# Patient Record
Sex: FEMALE | Race: WHITE | NOT HISPANIC OR LATINO | Employment: FULL TIME | URBAN - METROPOLITAN AREA
[De-identification: names, ages, dates, MRNs, and addresses within clinical notes are randomized per-mention and may not be internally consistent; named-entity substitution may affect disease eponyms.]

---

## 2024-08-27 ENCOUNTER — OFFICE VISIT (OUTPATIENT)
Dept: PRIMARY CARE | Facility: CLINIC | Age: 44
End: 2024-08-27
Payer: COMMERCIAL

## 2024-08-27 VITALS
HEART RATE: 67 BPM | DIASTOLIC BLOOD PRESSURE: 68 MMHG | OXYGEN SATURATION: 97 % | WEIGHT: 124 LBS | BODY MASS INDEX: 22.03 KG/M2 | SYSTOLIC BLOOD PRESSURE: 118 MMHG | TEMPERATURE: 98.7 F

## 2024-08-27 DIAGNOSIS — R20.2 PARESTHESIA OF RIGHT UPPER EXTREMITY: Primary | ICD-10-CM

## 2024-08-27 DIAGNOSIS — Z00.00 ROUTINE HEALTH MAINTENANCE: ICD-10-CM

## 2024-08-27 PROBLEM — K21.9 GERD (GASTROESOPHAGEAL REFLUX DISEASE): Status: ACTIVE | Noted: 2024-08-27

## 2024-08-27 PROBLEM — K58.2 MIXED IRRITABLE BOWEL SYNDROME: Status: ACTIVE | Noted: 2024-08-27

## 2024-08-27 PROBLEM — K29.70 GASTRITIS: Status: ACTIVE | Noted: 2024-08-27

## 2024-08-27 PROBLEM — D12.6 COLON ADENOMA: Status: ACTIVE | Noted: 2024-08-27

## 2024-08-27 PROBLEM — I73.00 RAYNAUD'S DISEASE: Status: ACTIVE | Noted: 2024-08-27

## 2024-08-27 PROBLEM — E55.9 VITAMIN D INSUFFICIENCY: Status: ACTIVE | Noted: 2024-08-27

## 2024-08-27 PROBLEM — N39.3 SUI (STRESS URINARY INCONTINENCE, FEMALE): Status: ACTIVE | Noted: 2024-08-27

## 2024-08-27 PROBLEM — K63.5 SESSILE COLONIC POLYP: Status: ACTIVE | Noted: 2024-08-27

## 2024-08-27 PROCEDURE — 99214 OFFICE O/P EST MOD 30 MIN: CPT | Performed by: FAMILY MEDICINE

## 2024-08-27 PROCEDURE — 1036F TOBACCO NON-USER: CPT | Performed by: FAMILY MEDICINE

## 2024-08-27 RX ORDER — ASPIRIN 81 MG/1
81 TABLET ORAL DAILY
COMMUNITY

## 2024-08-27 RX ORDER — LORATADINE 10 MG/1
CAPSULE, LIQUID FILLED ORAL
COMMUNITY

## 2024-08-27 RX ORDER — FLUTICASONE PROPIONATE 50 MCG
SPRAY, SUSPENSION (ML) NASAL
COMMUNITY

## 2024-08-27 RX ORDER — PREDNISONE 20 MG/1
TABLET ORAL
Qty: 18 TABLET | Refills: 0 | Status: SHIPPED | OUTPATIENT
Start: 2024-08-27 | End: 2024-09-04

## 2024-08-27 ASSESSMENT — ENCOUNTER SYMPTOMS
NUMBNESS: 1
COUGH: 0
SHORTNESS OF BREATH: 0
WEAKNESS: 0
FEVER: 0

## 2024-08-27 NOTE — PROGRESS NOTES
Subjective   Patient ID: Kennedi Tan is a 44 y.o. female who presents for Arm Numbness (Right arm numbness/tingling radiating from neck to fingers x few months /).    HPI   She is here today to discuss right arm paresthesias  A few months ago she had developed a dull pain in the upper part of her right arm.  There was no injury prior to onset, however she does do a lot of of lifting (her  is disabled)  Over the past month that she has had a tingling sensation.  This involves her entire right arm from her shoulder to her fingertips.  No weakness.  No numbness.  Nothing aggravates symptoms.  This is always present  She does also have a pain in her right lower neck but she states that this is mild  Taking Advil with some improvement  She is a former smoker.  Quit 15 to 16 years ago.  Prior to this it smoked up to 1 pack/day for approximately 10 years  No left arm symptoms      Review of Systems   Constitutional:  Negative for fever.   Respiratory:  Negative for cough and shortness of breath.    Cardiovascular:  Negative for chest pain.   Neurological:  Positive for numbness. Negative for weakness.       Objective   /68   Pulse 67   Temp 37.1 °C (98.7 °F) (Temporal)   Wt 56.2 kg (124 lb)   SpO2 97%   BMI 22.03 kg/m²     Physical Exam  Vitals reviewed.   Constitutional:       General: She is not in acute distress.     Appearance: Normal appearance. She is well-developed.   HENT:      Head: Normocephalic.   Eyes:      Conjunctiva/sclera: Conjunctivae normal.   Cardiovascular:      Rate and Rhythm: Normal rate and regular rhythm.      Heart sounds: Normal heart sounds.   Pulmonary:      Effort: Pulmonary effort is normal.      Breath sounds: Normal breath sounds.   Musculoskeletal:         General: Tenderness present.      Comments: Mild tenderness involving the right lower cervical paraspinals and superior aspect of the trapezius.  She has full right and left active cervical rotation.  She notes  aggravation of her right hand symptoms with Spurling's test on right side.  Upper extremity strength is plus 5 out of 5 bilaterally.  Upper extremity sensation is intact.  Biceps reflexes are slightly diminished on both the left and right side   Skin:     Findings: No rash.   Neurological:      Mental Status: She is alert.   Psychiatric:         Mood and Affect: Mood normal.         Behavior: Behavior normal.         Assessment/Plan   Assessment & Plan  Paresthesia of right upper extremity    Orders:    CBC; Future    Comprehensive Metabolic Panel; Future    Lipid Panel; Future    TSH with reflex to Free T4 if abnormal; Future    Vitamin B12; Future    predniSONE (Deltasone) 20 mg tablet; Take 3 tablets (60 mg) by mouth once daily for 3 days, THEN 2 tablets (40 mg) once daily for 3 days, THEN 1 tablet (20 mg) once daily for 3 days.    XR cervical spine complete 4-5 views; Future    EMG & nerve conduction; Future    Routine health maintenance    Orders:    CBC; Future    Comprehensive Metabolic Panel; Future    Lipid Panel; Future    TSH with reflex to Free T4 if abnormal; Future    Vitamin B12; Future    predniSONE (Deltasone) 20 mg tablet; Take 3 tablets (60 mg) by mouth once daily for 3 days, THEN 2 tablets (40 mg) once daily for 3 days, THEN 1 tablet (20 mg) once daily for 3 days.    XR cervical spine complete 4-5 views; Future    EMG & nerve conduction; Future    She presents today with a 1 month history of paresthesias involving her entire right upper extremity.  She does have mild neck pain and a positive Spurling's test on the right side, and I suspect that this would most likely be originating in her cervical spine.  We we will obtain a cervical spine x-ray as well as a right upper extremity EMG to further evaluate.  Treat with a 9-day prednisone taper, and we will also check labs including TSH, B12 and glucose to help rule out any metabolic abnormality which may be contributing to this.  Follow-up in 1  month for recheck and physical.  If symptoms have persisted or EMG is abnormal, we will discuss further workup with a possible cervical spine MRI at that time

## 2024-09-10 ENCOUNTER — HOSPITAL ENCOUNTER (OUTPATIENT)
Dept: RADIOLOGY | Facility: HOSPITAL | Age: 44
Discharge: HOME | End: 2024-09-10
Payer: COMMERCIAL

## 2024-09-10 DIAGNOSIS — Z00.00 ROUTINE HEALTH MAINTENANCE: ICD-10-CM

## 2024-09-10 DIAGNOSIS — R20.2 PARESTHESIA OF RIGHT UPPER EXTREMITY: ICD-10-CM

## 2024-09-10 PROCEDURE — 72050 X-RAY EXAM NECK SPINE 4/5VWS: CPT

## 2024-09-10 PROCEDURE — 72050 X-RAY EXAM NECK SPINE 4/5VWS: CPT | Performed by: STUDENT IN AN ORGANIZED HEALTH CARE EDUCATION/TRAINING PROGRAM

## 2024-09-11 ENCOUNTER — TELEPHONE (OUTPATIENT)
Dept: PRIMARY CARE | Facility: CLINIC | Age: 44
End: 2024-09-11
Payer: COMMERCIAL

## 2024-09-11 DIAGNOSIS — R93.89 ABNORMAL X-RAY: Primary | ICD-10-CM

## 2024-09-14 ENCOUNTER — HOSPITAL ENCOUNTER (OUTPATIENT)
Dept: RADIOLOGY | Facility: CLINIC | Age: 44
Discharge: HOME | End: 2024-09-14
Payer: COMMERCIAL

## 2024-09-14 DIAGNOSIS — R93.89 ABNORMAL X-RAY: ICD-10-CM

## 2024-09-14 PROCEDURE — 76536 US EXAM OF HEAD AND NECK: CPT | Performed by: STUDENT IN AN ORGANIZED HEALTH CARE EDUCATION/TRAINING PROGRAM

## 2024-09-14 PROCEDURE — 76536 US EXAM OF HEAD AND NECK: CPT

## 2024-09-18 ENCOUNTER — HOSPITAL ENCOUNTER (OUTPATIENT)
Dept: RADIOLOGY | Facility: HOSPITAL | Age: 44
Discharge: HOME | End: 2024-09-18
Payer: COMMERCIAL

## 2024-09-18 ENCOUNTER — APPOINTMENT (OUTPATIENT)
Dept: PRIMARY CARE | Facility: CLINIC | Age: 44
End: 2024-09-18
Payer: COMMERCIAL

## 2024-09-18 VITALS
TEMPERATURE: 97.7 F | OXYGEN SATURATION: 98 % | BODY MASS INDEX: 22.03 KG/M2 | SYSTOLIC BLOOD PRESSURE: 109 MMHG | DIASTOLIC BLOOD PRESSURE: 75 MMHG | WEIGHT: 124 LBS | HEART RATE: 73 BPM

## 2024-09-18 DIAGNOSIS — Z12.31 ENCOUNTER FOR SCREENING MAMMOGRAM FOR MALIGNANT NEOPLASM OF BREAST: ICD-10-CM

## 2024-09-18 DIAGNOSIS — G89.29 CHRONIC NECK PAIN: Primary | ICD-10-CM

## 2024-09-18 DIAGNOSIS — M54.2 CHRONIC NECK PAIN: Primary | ICD-10-CM

## 2024-09-18 DIAGNOSIS — R20.0 RIGHT ARM NUMBNESS: ICD-10-CM

## 2024-09-18 PROCEDURE — 77063 BREAST TOMOSYNTHESIS BI: CPT | Performed by: RADIOLOGY

## 2024-09-18 PROCEDURE — 1036F TOBACCO NON-USER: CPT | Performed by: FAMILY MEDICINE

## 2024-09-18 PROCEDURE — 77067 SCR MAMMO BI INCL CAD: CPT

## 2024-09-18 PROCEDURE — 99214 OFFICE O/P EST MOD 30 MIN: CPT | Performed by: FAMILY MEDICINE

## 2024-09-18 PROCEDURE — 77067 SCR MAMMO BI INCL CAD: CPT | Performed by: RADIOLOGY

## 2024-09-18 RX ORDER — MELOXICAM 15 MG/1
15 TABLET ORAL DAILY PRN
Qty: 30 TABLET | Refills: 1 | Status: SHIPPED | OUTPATIENT
Start: 2024-09-18

## 2024-09-18 RX ORDER — GABAPENTIN 100 MG/1
100 CAPSULE ORAL DAILY
Qty: 30 CAPSULE | Refills: 2 | Status: SHIPPED | OUTPATIENT
Start: 2024-09-18

## 2024-09-18 ASSESSMENT — ENCOUNTER SYMPTOMS
NUMBNESS: 1
WEAKNESS: 1
NECK PAIN: 1
HEADACHES: 0
COUGH: 0
FEVER: 0

## 2024-09-18 NOTE — PROGRESS NOTES
Subjective   Patient ID: Kennedi Tan is a 44 y.o. female who presents for No chief complaint on file..    HPI     Review of Systems    Objective   /75   Pulse 73   Temp 36.5 °C (97.7 °F) (Temporal)   Wt 56.2 kg (124 lb)   SpO2 98%   BMI 22.03 kg/m²     Physical Exam    Assessment/Plan   Assessment & Plan  Neck pain    Orders:    MR cervical spine wo IV contrast; Future    meloxicam (Mobic) 15 mg tablet; Take 1 tablet (15 mg) by mouth once daily as needed (pain).    gabapentin (Neurontin) 100 mg capsule; Take 1 capsule (100 mg) by mouth once daily.    Right arm numbness    Orders:    MR cervical spine wo IV contrast; Future    meloxicam (Mobic) 15 mg tablet; Take 1 tablet (15 mg) by mouth once daily as needed (pain).    gabapentin (Neurontin) 100 mg capsule; Take 1 capsule (100 mg) by mouth once daily.

## 2024-09-18 NOTE — PROGRESS NOTES
Subjective   Patient ID: Kennedi Tan is a 44 y.o. female who presents for No chief complaint on file..    HPI       She is here today for follow-up on neck pain and right arm symptoms  She was most recently seen on 8/27.    A few months prior to this appointment she had developed a dull pain involving the upper part of her right arm.  1 month prior to the last appointment, she had developed a tingling sensation which would involve her entire right arm from her shoulder to her fingertips    At that time we ordered labs, cervical spine x-ray, and right upper extremity EMG to further evaluate.  She was treated with a 9-day tapering course of prednisone    Cervical spine x-ray done 9/10/2024 showed straightening of the cervical spine either positional or related to muscle spasm.  No acute displaced fracture or major malalignment.  There was also a finding of nonspecific bilateral asymmetric soft tissue opacity surrounding the upper cervical region could be overlapping shadowing/artifact or prominent thyroid tissue  She had a neck ultrasound done 9/14/2024 to follow-up on the above findings.  Neck ultrasound was unremarkable and did not show any suspicious lymphadenopathy or thyroid nodules      She has continued to have symptoms.  Since taking the steroid she has noticed that the pain seems to be more localized to her upper shoulder area.  She has had pain in her neck which has been present for approximately 3 months.  This is mild.  Most of her pain is located in her right superior shoulder area.  This is a burning pain which can be up to a 5 out of 10 intensity  She has been getting intermittent paresthesias and now is getting intermittent numbness in her right arm  Over the past few weeks, her entire right arm will go numb.  This occurs several times per day and can occur when she is using her arm.  She will also get tingling involving her entire right arm which can be triggered by fully extending her neck  Her  right arm has felt heavy and she will get some weakness    She has been doing home stretches and taking analgesics for at least the past 6 weeks without improvement  No cough or wheezing  No headache  No numbness or weakness involving left arm or either leg  She is scheduled to have an EMG but this is not scheduled until next month    Review of Systems   Constitutional:  Negative for fever.   Respiratory:  Negative for cough.    Cardiovascular:  Negative for chest pain.   Musculoskeletal:  Positive for neck pain.   Neurological:  Positive for weakness and numbness. Negative for headaches.       Objective   /75   Pulse 73   Temp 36.5 °C (97.7 °F) (Temporal)   Wt 56.2 kg (124 lb)   SpO2 98%   BMI 22.03 kg/m²     Physical Exam  Vitals reviewed.   Constitutional:       General: She is not in acute distress.     Appearance: Normal appearance. She is well-developed.   HENT:      Head: Normocephalic.   Eyes:      Conjunctiva/sclera: Conjunctivae normal.   Cardiovascular:      Rate and Rhythm: Normal rate and regular rhythm.      Heart sounds: Normal heart sounds.   Pulmonary:      Effort: Pulmonary effort is normal.      Breath sounds: Normal breath sounds.   Musculoskeletal:         General: Tenderness present.      Comments: Tenderness involving the right C5-C7 cervical paraspinals and also superior aspect of right shoulder  She has a full left and right cervical rotation.  Active extension of her neck reproduces numbness in her right arm.  She has a positive Spurling's test on the right  She has slightly decreased sensation involving the first through the fifth fingers of her right hand (greatest 1st through 3rd)  Biceps reflexes are decreased at plus 1 out of 4 bilaterally  Normal  strength.  She has slight weakness with right shoulder abduction against resistance   Skin:     Findings: No rash.   Neurological:      Mental Status: She is alert.      Sensory: Sensory deficit present.      Motor: Weakness  present.      Deep Tendon Reflexes: Reflexes abnormal.   Psychiatric:         Mood and Affect: Mood normal.         Behavior: Behavior normal.         Assessment/Plan   Assessment & Plan  Chronic neck pain    Orders:    MR cervical spine wo IV contrast; Future    meloxicam (Mobic) 15 mg tablet; Take 1 tablet (15 mg) by mouth once daily as needed (pain).    gabapentin (Neurontin) 100 mg capsule; Take 1 capsule (100 mg) by mouth once daily.    Right arm numbness    Orders:    MR cervical spine wo IV contrast; Future    meloxicam (Mobic) 15 mg tablet; Take 1 tablet (15 mg) by mouth once daily as needed (pain).    gabapentin (Neurontin) 100 mg capsule; Take 1 capsule (100 mg) by mouth once daily.    She presents today with a 3-month history of neck pain, with accompanying paresthesias, numbness, and weakness involving her right arm  She has done at least 6 weeks of conservative therapy including home stretches and analgesics without improvement  We reviewed her cervical spine x-ray done 9/10/2024 which did not show any acute findings  On exam today she does have a positive Spurling's test on the right side, decreased sensation involving the 1st through 5th fingers of her right hand, abnormal biceps reflexes and slight weakness with shoulder abduction against resistance.  I would like to proceed with a cervical spine MRI to further evaluate.  We will order this today.  Start meloxicam 15 mg daily as needed and also gabapentin 100 mg daily as needed.  Adverse effects of gabapentin have been discussed and O[facility] reviewed.  Plan on following up by phone once I have MRI results to discuss further management

## 2024-09-27 ENCOUNTER — APPOINTMENT (OUTPATIENT)
Dept: PRIMARY CARE | Facility: CLINIC | Age: 44
End: 2024-09-27
Payer: COMMERCIAL

## 2024-10-03 ENCOUNTER — HOSPITAL ENCOUNTER (OUTPATIENT)
Dept: RADIOLOGY | Facility: CLINIC | Age: 44
Discharge: HOME | End: 2024-10-03
Payer: COMMERCIAL

## 2024-10-03 DIAGNOSIS — R20.0 RIGHT ARM NUMBNESS: ICD-10-CM

## 2024-10-03 DIAGNOSIS — G89.29 CHRONIC NECK PAIN: ICD-10-CM

## 2024-10-03 DIAGNOSIS — M54.2 CHRONIC NECK PAIN: ICD-10-CM

## 2024-10-03 PROCEDURE — 72141 MRI NECK SPINE W/O DYE: CPT

## 2024-10-07 ENCOUNTER — TELEPHONE (OUTPATIENT)
Dept: PRIMARY CARE | Facility: CLINIC | Age: 44
End: 2024-10-07
Payer: COMMERCIAL

## 2024-10-07 DIAGNOSIS — M47.22 OSTEOARTHRITIS OF SPINE WITH RADICULOPATHY, CERVICAL REGION: Primary | ICD-10-CM

## 2024-10-07 NOTE — TELEPHONE ENCOUNTER
I reviewed her MRI results.    This test was ordered due to a several month history of pain involving her right lower neck and right superior shoulder area, as well as paresthesias and intermittent numbness involving her entire right arm.  Symptoms are triggered with neck extension    Her cervical spine MRI shows mild degenerative changes on the right at C5-6 with no significant central canal stenosis.  I discussed results with her over the phone.  She is still having symptoms.  She is scheduled to have a right upper extremity EMG done on 10/22  Given that she is still having symptoms, I would like to refer her to orthopedics for further evaluation and to discuss whether or not they feel that these findings are the cause of her right arm symptoms  We will plan on following up by phone once I have her EMG results.  Recommended that she contact me if any new or worsening symptoms

## 2024-10-21 ENCOUNTER — OFFICE VISIT (OUTPATIENT)
Dept: ORTHOPEDIC SURGERY | Facility: CLINIC | Age: 44
End: 2024-10-21
Payer: COMMERCIAL

## 2024-10-21 DIAGNOSIS — M54.12 CERVICAL RADICULOPATHY: Primary | ICD-10-CM

## 2024-10-21 DIAGNOSIS — M47.22 OSTEOARTHRITIS OF SPINE WITH RADICULOPATHY, CERVICAL REGION: ICD-10-CM

## 2024-10-21 PROCEDURE — 99204 OFFICE O/P NEW MOD 45 MIN: CPT | Performed by: PHYSICIAN ASSISTANT

## 2024-10-21 PROCEDURE — 99214 OFFICE O/P EST MOD 30 MIN: CPT | Performed by: PHYSICIAN ASSISTANT

## 2024-10-21 NOTE — PROGRESS NOTES
Kennedi Tan is a 44 y.o. female who presents for Pain and New Patient Visit of the Neck (Pain in the neck, tingling/numbness on the right side radiates down the arm/Ongoing for a couple months/Denies injury/fall/Xray @  [facility] 9/10.24/MRI @  [facility] 10/3/24).    HPI:  44-year-old female here for neck pain and right arm radicular symptoms.  She denies any fever chills nausea vomiting night sweats.  She has no bowel or bladder complaints.    Physical exam:  Well-nourished, well kept.  No lymphangitis or lymphadenopathy in the examined extremities. Affect normal.  Alert and oriented X 3.  Coordination normal.  Patient can rise from a seated position, can sit from a standing position. Can stand on heels and toes.  Patient is tender in the paraspinal musculature of the cervical spine. range of motion is mildly decreased secondary to some pain and stiffness no weakness no instability to muscle strength. examination of the upper extremities reveals no point tenderness, swelling, or deformity.  Range of motion of the shoulders, elbows, wrists, and fingers are full without crepitance, instability, or exacerbation of pain. Strength is 5/5 throughout. no redness, abrasions, or lesions on the upper extremities bilaterally.  Gross sensation intact to the extremities.  Deep tendon reflexes 2+ and symmetric bilaterally.  Caraballo negative.     Imaging studies:  An MRI of the cervical spine from October 3, 2024 was reviewed.  X-rays of the cervical spine from September 10, 2024 were reviewed.    Assessment:  44-year-old female here for evaluation of mostly right arm radicular symptoms and a little bit of right sided neck stiffness.  This has been going on for about 2 months.  She does do a lot of heavy lifting, she carries heavy water bottles up on her right shoulder.  She saw her primary care doctor, Dr. Stevens who sent her over to us.  She was treated initially with steroids, and now gabapentin.  She has noticed that most of the  pain in the right arm is gone and all she has is some numbness and tingling down the arm into the hand into the thumb and index finger mostly.  Thing on the left side.  No significant neck pain.  She has not done anything conservatively for this and does not have chronic neck problems.  Her x-rays show some mild degenerative changes, on the MRI she does have a herniated disc at C5-6 on the right causing some mild to moderate foraminal stenosis at that level.  This is most likely her symptom generator.  We did discuss options today, she does not want to think about surgery at this point and would like to try more conservative methods.    Plan:  I would like to get her into some physical therapy, something with a manual component and modalities.  I will see her back in 6 weeks.  If we ever end up doing an operation on her it would be a C5-6 total disc arthroplasty versus ACDF.    I have reviewed tests, x-rays, MRI, ultrasound of the neck.  She is here with someone today acting as a helpful and necessary historian.  This is an undiagnosed new problem with uncertain prognosis that has the potential to affect her bodily function.    Matheus Leon PA-C

## 2024-10-22 ENCOUNTER — TELEPHONE (OUTPATIENT)
Dept: PRIMARY CARE | Facility: CLINIC | Age: 44
End: 2024-10-22

## 2024-10-22 ENCOUNTER — HOSPITAL ENCOUNTER (OUTPATIENT)
Dept: NEUROLOGY | Facility: HOSPITAL | Age: 44
Discharge: HOME | End: 2024-10-22
Payer: COMMERCIAL

## 2024-10-22 DIAGNOSIS — Z00.00 ROUTINE HEALTH MAINTENANCE: ICD-10-CM

## 2024-10-22 DIAGNOSIS — R20.2 PARESTHESIA OF RIGHT UPPER EXTREMITY: ICD-10-CM

## 2024-10-22 PROCEDURE — 95886 MUSC TEST DONE W/N TEST COMP: CPT | Performed by: PSYCHIATRY & NEUROLOGY

## 2024-10-22 PROCEDURE — 95909 NRV CNDJ TST 5-6 STUDIES: CPT | Performed by: PSYCHIATRY & NEUROLOGY

## 2024-10-22 NOTE — TELEPHONE ENCOUNTER
Her EMG was unremarkable.  She did see orthopedics and they feel that her right arm symptoms are related to a herniated disc at C5-6 on the right causing mild to moderate foraminal stenosis.  They discussed surgery, but she is not interested in having any procedures done at this time.  They did refer her for physical therapy.  I did offer a referral to see pain management to discuss injections, but she would prefer to start with PT.  If symptoms start to worsen, or if she would like a referral, she can call or message me and we can place a referral for pain management in the chart

## 2024-10-22 NOTE — ADDENDUM NOTE
Encounter addended by: Jody Negro, RT on: 10/22/2024 3:41 PM   Actions taken: Imaging Exam ended, Check Out activity completed

## 2024-11-06 DIAGNOSIS — G89.29 CHRONIC NECK PAIN: ICD-10-CM

## 2024-11-06 DIAGNOSIS — M54.2 CHRONIC NECK PAIN: ICD-10-CM

## 2024-11-06 DIAGNOSIS — R20.0 RIGHT ARM NUMBNESS: ICD-10-CM

## 2024-11-06 RX ORDER — MELOXICAM 15 MG/1
15 TABLET ORAL DAILY PRN
Qty: 30 TABLET | Refills: 1 | Status: SHIPPED | OUTPATIENT
Start: 2024-11-06

## 2024-12-02 ENCOUNTER — APPOINTMENT (OUTPATIENT)
Dept: ORTHOPEDIC SURGERY | Facility: CLINIC | Age: 44
End: 2024-12-02
Payer: COMMERCIAL

## 2024-12-16 ENCOUNTER — APPOINTMENT (OUTPATIENT)
Dept: ORTHOPEDIC SURGERY | Facility: CLINIC | Age: 44
End: 2024-12-16
Payer: COMMERCIAL

## 2024-12-16 DIAGNOSIS — M54.2 CHRONIC NECK PAIN: ICD-10-CM

## 2024-12-16 DIAGNOSIS — G89.29 CHRONIC NECK PAIN: ICD-10-CM

## 2024-12-16 DIAGNOSIS — R20.0 RIGHT ARM NUMBNESS: ICD-10-CM

## 2024-12-16 RX ORDER — GABAPENTIN 100 MG/1
100 CAPSULE ORAL DAILY
Qty: 30 CAPSULE | Refills: 2 | Status: SHIPPED | OUTPATIENT
Start: 2024-12-16

## 2025-01-07 ENCOUNTER — TELEPHONE (OUTPATIENT)
Dept: PRIMARY CARE | Facility: CLINIC | Age: 45
End: 2025-01-07
Payer: COMMERCIAL

## 2025-01-07 DIAGNOSIS — Z12.11 SCREENING FOR COLON CANCER: Primary | ICD-10-CM

## 2025-01-13 DIAGNOSIS — Z12.11 COLON CANCER SCREENING: ICD-10-CM

## 2025-01-13 PROBLEM — N39.0 ACUTE URINARY TRACT INFECTION: Status: ACTIVE | Noted: 2025-01-13

## 2025-01-13 PROBLEM — H69.90 DYSFUNCTION OF EUSTACHIAN TUBE: Status: ACTIVE | Noted: 2025-01-13

## 2025-01-13 PROBLEM — L65.9 LOSS OF HAIR: Status: ACTIVE | Noted: 2025-01-13

## 2025-01-13 PROBLEM — H60.509 ACUTE OTITIS EXTERNA: Status: ACTIVE | Noted: 2025-01-13

## 2025-01-13 PROBLEM — R10.2 PELVIC PAIN IN FEMALE: Status: ACTIVE | Noted: 2025-01-13

## 2025-01-14 RX ORDER — POLYETHYLENE GLYCOL 3350, SODIUM CHLORIDE, SODIUM BICARBONATE, POTASSIUM CHLORIDE 420; 11.2; 5.72; 1.48 G/4L; G/4L; G/4L; G/4L
4000 POWDER, FOR SOLUTION ORAL ONCE
Qty: 4000 ML | Refills: 0 | Status: SHIPPED | OUTPATIENT
Start: 2025-03-17 | End: 2025-03-17

## 2025-01-30 ENCOUNTER — APPOINTMENT (OUTPATIENT)
Dept: PRIMARY CARE | Facility: CLINIC | Age: 45
End: 2025-01-30
Payer: COMMERCIAL

## 2025-03-14 ENCOUNTER — ANESTHESIA EVENT (OUTPATIENT)
Dept: GASTROENTEROLOGY | Facility: EXTERNAL LOCATION | Age: 45
End: 2025-03-14

## 2025-03-26 ENCOUNTER — APPOINTMENT (OUTPATIENT)
Dept: GASTROENTEROLOGY | Facility: EXTERNAL LOCATION | Age: 45
End: 2025-03-26
Payer: COMMERCIAL

## 2025-03-26 ENCOUNTER — ANESTHESIA (OUTPATIENT)
Dept: GASTROENTEROLOGY | Facility: EXTERNAL LOCATION | Age: 45
End: 2025-03-26

## 2025-03-26 VITALS
HEIGHT: 63 IN | TEMPERATURE: 98.2 F | SYSTOLIC BLOOD PRESSURE: 99 MMHG | HEART RATE: 61 BPM | RESPIRATION RATE: 14 BRPM | DIASTOLIC BLOOD PRESSURE: 68 MMHG | OXYGEN SATURATION: 100 % | BODY MASS INDEX: 23.04 KG/M2 | WEIGHT: 130 LBS

## 2025-03-26 DIAGNOSIS — Z12.11 SCREENING FOR COLON CANCER: ICD-10-CM

## 2025-03-26 LAB — PREGNANCY TEST URINE, POC: NEGATIVE

## 2025-03-26 PROCEDURE — 81025 URINE PREGNANCY TEST: CPT | Performed by: INTERNAL MEDICINE

## 2025-03-26 PROCEDURE — 45378 DIAGNOSTIC COLONOSCOPY: CPT | Performed by: INTERNAL MEDICINE

## 2025-03-26 RX ORDER — LIDOCAINE HYDROCHLORIDE 20 MG/ML
INJECTION, SOLUTION INFILTRATION; PERINEURAL AS NEEDED
Status: DISCONTINUED | OUTPATIENT
Start: 2025-03-26 | End: 2025-03-26

## 2025-03-26 RX ORDER — PROPOFOL 10 MG/ML
INJECTION, EMULSION INTRAVENOUS AS NEEDED
Status: DISCONTINUED | OUTPATIENT
Start: 2025-03-26 | End: 2025-03-26

## 2025-03-26 RX ORDER — SODIUM CHLORIDE 9 MG/ML
INJECTION, SOLUTION INTRAVENOUS CONTINUOUS PRN
Status: DISCONTINUED | OUTPATIENT
Start: 2025-03-26 | End: 2025-03-26

## 2025-03-26 RX ORDER — ONDANSETRON HYDROCHLORIDE 2 MG/ML
4 INJECTION, SOLUTION INTRAVENOUS ONCE AS NEEDED
Status: DISCONTINUED | OUTPATIENT
Start: 2025-03-26 | End: 2025-03-27 | Stop reason: HOSPADM

## 2025-03-26 RX ADMIN — PROPOFOL 150 MG: 10 INJECTION, EMULSION INTRAVENOUS at 10:46

## 2025-03-26 RX ADMIN — PROPOFOL 50 MG: 10 INJECTION, EMULSION INTRAVENOUS at 10:54

## 2025-03-26 RX ADMIN — LIDOCAINE HYDROCHLORIDE 50 MG: 20 INJECTION, SOLUTION INFILTRATION; PERINEURAL at 10:47

## 2025-03-26 RX ADMIN — PROPOFOL 50 MG: 10 INJECTION, EMULSION INTRAVENOUS at 10:52

## 2025-03-26 RX ADMIN — SODIUM CHLORIDE: 9 INJECTION, SOLUTION INTRAVENOUS at 10:41

## 2025-03-26 SDOH — HEALTH STABILITY: MENTAL HEALTH: CURRENT SMOKER: 0

## 2025-03-26 ASSESSMENT — PAIN SCALES - GENERAL
PAINLEVEL_OUTOF10: 0 - NO PAIN
PAIN_LEVEL: 0
PAINLEVEL_OUTOF10: 0 - NO PAIN

## 2025-03-26 ASSESSMENT — PAIN - FUNCTIONAL ASSESSMENT
PAIN_FUNCTIONAL_ASSESSMENT: 0-10

## 2025-03-26 ASSESSMENT — COLUMBIA-SUICIDE SEVERITY RATING SCALE - C-SSRS
1. IN THE PAST MONTH, HAVE YOU WISHED YOU WERE DEAD OR WISHED YOU COULD GO TO SLEEP AND NOT WAKE UP?: NO
2. HAVE YOU ACTUALLY HAD ANY THOUGHTS OF KILLING YOURSELF?: NO

## 2025-03-26 NOTE — ANESTHESIA PREPROCEDURE EVALUATION
Patient: Kennedi Tan    Procedure Information       Anesthesia Start Date/Time: 03/26/25 1041    Scheduled providers: Veronica CHAMORRO MD    Procedure: COLONOSCOPY    Location: [facility]            Relevant Problems   GI   (+) GERD (gastroesophageal reflux disease)   (+) Mixed irritable bowel syndrome      /Renal   (+) Acute urinary tract infection      ID   (+) Acute urinary tract infection       Clinical information reviewed:   Tobacco  Allergies  Meds   Med Hx  Surg Hx  OB Status  Fam Hx  Soc   Hx        NPO Detail:  NPO/Void Status  Date of Last Liquid: 03/26/25  Time of Last Liquid: 0610  Date of Last Solid: 03/24/25  Last Intake Type: Clear fluids         Physical Exam    Airway  Mallampati: II  TM distance: >3 FB  Neck ROM: full     Cardiovascular - normal exam     Dental - normal exam     Pulmonary - normal exam  Breath sounds clear to auscultation     Abdominal        Anesthesia Plan    History of general anesthesia?: yes  History of complications of general anesthesia?: no    ASA 2     MAC     The patient is not a current smoker.    intravenous induction   Anesthetic plan and risks discussed with patient.    Plan discussed with CRNA.

## 2025-03-26 NOTE — DISCHARGE INSTRUCTIONS
Patient Instructions Post Endoscopy Procedure      The anesthetics, sedatives or narcotics which were given to you today will be acting in your body for the next 24 hours, so you might feel a little sleepy or groggy.  This feeling should slowly wear off. Carefully read and follow the instructions.     You received sedation today:  - Do not drive or operate any machinery or power tools of any kind.   - No alcoholic beverages today, not even beer or wine.  - Do not make any important decisions or sign any legal documents.  - No over the counter medications that contain alcohol or that may cause drowsiness.    While it is common to experience mild to moderate abdominal distention, gas, or belching after your procedure, if any of these symptoms occur following discharge from the GI Lab or within one week of having your procedure, call the [facility] to be advised whether a visit to your nearest Urgent Care or Emergency Department is indicated.  Take this paper with you if you go.   - If you develop an allergic reaction to the medications that were given during your procedure such as difficulty breathing, rash, hives, severe nausea, vomiting or lightheadedness.  - If you experience chest pain, shortness of breath, severe abdominal pain, fevers and chills.  -If you develop signs and symptoms of bleeding such as blood in your spit, if your stools turn black, tarry, or bloody  - If you have not urinated within 8 hours following your procedure.  - If your IV site becomes painful, red, inflamed, or looks infected.      Your physician recommends the additional following instructions:    -You have a contact number available for emergencies. The signs and symptoms of potential delayed complications were discussed with you. You may return to normal activities tomorrow.  -Resume your previous diet or other if specified.  -Continue your present medications.   -We are waiting for your pathology results, if  applicable. The results will be available in [employer] I will send you a message with any recommendations.  -The findings and recommendations have been discussed with you and/or family.  -Please see Medication Reconciliation Form for new medication/medications prescribed.     If you experience any problems or have any questions following discharge from the GI Lab, please call: [phone] from 7 am- 4:30 pm.  In the event of an emergency please go to the closest Emergency Department or call Dr. Ireland at [phone]

## 2025-03-26 NOTE — ANESTHESIA POSTPROCEDURE EVALUATION
Patient: Kennedi Tan    Procedure Summary       Date: 03/26/25 Room / Location: [facility]    Anesthesia Start: 1041 Anesthesia Stop:     Procedure: COLONOSCOPY Diagnosis: Screening for colon cancer    Scheduled Providers: Veronica CHAMORRO MD Responsible Provider: JU Neves    Anesthesia Type: MAC ASA Status: 2            Anesthesia Type: MAC    Vitals Value Taken Time   BP 90/54 03/26/25 1102   Temp 36.8 °C (98.2 °F) 03/26/25 1102   Pulse 62 03/26/25 1102   Resp 11 03/26/25 1102   SpO2 100 % 03/26/25 1102       Anesthesia Post Evaluation    Patient location during evaluation: bedside  Patient participation: complete - patient cannot participate  Level of consciousness: awake and responsive to verbal stimuli  Pain score: 0  Pain management: adequate  Airway patency: patent  Cardiovascular status: acceptable and hemodynamically stable  Respiratory status: acceptable  Hydration status: acceptable  Postoperative Nausea and Vomiting: none    No notable events documented.

## 2025-03-26 NOTE — H&P
Outpatient Hospital Procedure    Patient Profile-Procedures  Initial Info  Patient Demographics  Name Kennedi Tan  Date of Birth 1980  MRN [id number]  Address   [address] [place] [facility] [place] [id number] [place] [facility] [place] [id number]    Primary Phone Number [phone]  Secondary Phone Number    PCP Kike Stevens    Procedures   Colonoscopy      Indication:  Surveillance - TA, SSA 2022    Primary contact name and number   Extended Emergency Contact Information  Primary Emergency Contact: Kimani Tan  Home Phone: [phone]  Relation: Spouse    General Health  Weight There were no vitals filed for this visit.  BMI There is no height or weight on file to calculate BMI.    Allergies  Allergies   Allergen Reactions    Penicillins Unknown, Rash and Other       Past Medical History   Past Medical History:   Diagnosis Date    Personal history of other diseases of the respiratory system 10/09/2015    History of acute bronchitis       Provider assessment  Diagnosis  Medication Reviewed - yes  Prior to Admission medications    Medication Sig Start Date End Date Taking? Authorizing Provider   aspirin 81 mg EC tablet Take 1 tablet (81 mg) by mouth once daily.   Yes Historical Provider, MD   diphenhydramine HCl (BENADRYL ALLERGY ORAL) Take by mouth.   Yes Historical Provider, MD   fluticasone (Flonase) 50 mcg/actuation nasal spray Administer into affected nostril(s).   Yes Historical Provider, MD   gabapentin (Neurontin) 100 mg capsule Take 1 capsule (100 mg) by mouth once daily. 12/16/24  Yes Kike Stevens, DO   levonorgestrel (Mirena) 21 mcg/24 hr (8 yrs) 52 mg IUD by intrauterine route.   Yes Historical Provider, MD   loratadine (Claritin Liqui-Gel) 10 mg capsule Take by mouth.   Yes Historical Provider, MD   meloxicam (Mobic) 15 mg tablet Take 1 tablet (15 mg) by mouth once daily as needed (pain). 11/6/24  Yes Kike Stevens, DO       This is my H&P    Physical Exam  Physical Exam  Constitutional:       Comments:  Awake   HENT:      Head: Normocephalic.   Cardiovascular:      Rate and Rhythm: Normal rate and regular rhythm.   Pulmonary:      Effort: Pulmonary effort is normal.      Breath sounds: Normal breath sounds.   Abdominal:      General: Bowel sounds are normal.      Palpations: Abdomen is soft.   Neurological:      Mental Status: She is alert.   Psychiatric:         Mood and Affect: Mood normal.           Oropharyngeal Classification II (hard and soft palate, upper portion of tonsils and uvula visible)  ASA PS Classification 2  Sedation Plan Deep  Procedure Plan - pre-procedural (re)assesment completed by physician:  discharge/transfer patient when discharge criteria met    Veronica Ireland MD  3/26/2025 10:26 AM